# Patient Record
Sex: FEMALE | Race: AMERICAN INDIAN OR ALASKA NATIVE
[De-identification: names, ages, dates, MRNs, and addresses within clinical notes are randomized per-mention and may not be internally consistent; named-entity substitution may affect disease eponyms.]

---

## 2019-03-10 ENCOUNTER — HOSPITAL ENCOUNTER (OUTPATIENT)
Dept: HOSPITAL 5 - TRG | Age: 19
Setting detail: OBSERVATION
LOS: 1 days | Discharge: HOME | End: 2019-03-11
Attending: OBSTETRICS & GYNECOLOGY | Admitting: OBSTETRICS & GYNECOLOGY
Payer: MEDICAID

## 2019-03-10 DIAGNOSIS — Z3A.36: ICD-10-CM

## 2019-03-10 DIAGNOSIS — O60.03: Primary | ICD-10-CM

## 2019-03-10 LAB
BILIRUB UR QL STRIP: (no result)
BLOOD UR QL VISUAL: (no result)
HCT VFR BLD CALC: 33.6 % (ref 30.3–42.9)
HGB BLD-MCNC: 11.1 GM/DL (ref 10.1–14.3)
MCHC RBC AUTO-ENTMCNC: 33 % (ref 30–34)
MCV RBC AUTO: 85 FL (ref 79–97)
MUCOUS THREADS #/AREA URNS HPF: (no result) /HPF
PH UR STRIP: 7 [PH] (ref 5–7)
PLATELET # BLD: 253 K/MM3 (ref 140–440)
RBC # BLD AUTO: 3.96 M/MM3 (ref 3.65–5.03)
RBC #/AREA URNS HPF: 2 /HPF (ref 0–6)
UROBILINOGEN UR-MCNC: 2 MG/DL (ref ?–2)
WBC #/AREA URNS HPF: < 1 /HPF (ref 0–6)

## 2019-03-10 PROCEDURE — 96366 THER/PROPH/DIAG IV INF ADDON: CPT

## 2019-03-10 PROCEDURE — 36415 COLL VENOUS BLD VENIPUNCTURE: CPT

## 2019-03-10 PROCEDURE — 59025 FETAL NON-STRESS TEST: CPT

## 2019-03-10 PROCEDURE — G0378 HOSPITAL OBSERVATION PER HR: HCPCS

## 2019-03-10 PROCEDURE — 85027 COMPLETE CBC AUTOMATED: CPT

## 2019-03-10 PROCEDURE — 81001 URINALYSIS AUTO W/SCOPE: CPT

## 2019-03-10 PROCEDURE — 86592 SYPHILIS TEST NON-TREP QUAL: CPT

## 2019-03-10 PROCEDURE — 86901 BLOOD TYPING SEROLOGIC RH(D): CPT

## 2019-03-10 PROCEDURE — 96365 THER/PROPH/DIAG IV INF INIT: CPT

## 2019-03-10 PROCEDURE — 86850 RBC ANTIBODY SCREEN: CPT

## 2019-03-10 PROCEDURE — 86900 BLOOD TYPING SEROLOGIC ABO: CPT

## 2019-03-10 RX ADMIN — AMPICILLIN SODIUM SCH MLS/HR: 1 INJECTION, POWDER, FOR SOLUTION INTRAMUSCULAR; INTRAVENOUS at 23:36

## 2019-03-11 VITALS — SYSTOLIC BLOOD PRESSURE: 121 MMHG | DIASTOLIC BLOOD PRESSURE: 74 MMHG

## 2019-03-11 RX ADMIN — AMPICILLIN SODIUM SCH MLS/HR: 1 INJECTION, POWDER, FOR SOLUTION INTRAMUSCULAR; INTRAVENOUS at 03:38

## 2019-03-11 NOTE — SHORT STAY SUMMARY
Short Stay Documentation


Date of service: 19


Narrative H&P: 


19y/o  @ 36+4 week presents with irregular contractions and cervical 

dilation to 4cm. The patient denies leakage of fluid or vaginal bleeding. The 

patient was admitted for observation.








- History


Principal diagnosis:  labor


Past Medical History: No medical history


Past Surgical History: No surgical history


Social history: single





- Allergies and Medications


Current Medications: 


                                    Allergies





No Known Allergies Allergy (Verified 03/10/19 16:27)


   





                                Home Medications











 Medication  Instructions  Recorded  Confirmed  Last Taken  Type


 


Prenatal Vit,Guru 74/Iron/Folic 1 each PO DAILY 03/10/19 03/10/19 Unknown History





[Prenatal Low Iron Tablet]     








Active Medications





Butorphanol Tartrate (Stadol)  2 mg IV Q2H PRN


   PRN Reason: Labor Pain


Lactated Ringer's (Lactated Ringers)  1,000 mls @ 125 mls/hr IV AS DIRECT LUCA


   Last Admin: 03/10/19 18:19 Dose:  125 mls/hr


   Documented by: 


Ampicillin Sodium (Ampicillin/Ns 1 Gm/50 Ml)  1 gm in 50 mls @ 100 mls/hr IV 

Q4HR LUCA; Protocol


   Last Admin: 19 03:38 Dose:  100 mls/hr


   Documented by: 











- Physical exam


General appearance: no acute distress


Integumentary: no rash


HEENT: Atraumatic


Lungs: Clear to auscultation


Breasts: deferred





- Hospital course


Hospital course: 


Patient admitted @ 36+3 weeks for  labor. The patient was observed 

without further cervical change. She remained stable at 4cm. She denies leakage 

of fluid.








- Disposition


Condition at discharge: Good


Disposition: DC-01 TO HOME OR SELFCARE





Short Stay Discharge Plan


Activity: other (pelvic rest)


Diet: regular


Additional Instructions: 


schedule OB visit in one week

## 2019-03-12 ENCOUNTER — HOSPITAL ENCOUNTER (OUTPATIENT)
Dept: HOSPITAL 5 - TRG | Age: 19
Discharge: HOME | End: 2019-03-12
Attending: OBSTETRICS & GYNECOLOGY
Payer: MEDICAID

## 2019-03-12 VITALS — DIASTOLIC BLOOD PRESSURE: 58 MMHG | SYSTOLIC BLOOD PRESSURE: 123 MMHG

## 2019-03-12 DIAGNOSIS — O47.03: ICD-10-CM

## 2019-03-12 DIAGNOSIS — Z3A.37: Primary | ICD-10-CM

## 2019-03-12 PROCEDURE — 59025 FETAL NON-STRESS TEST: CPT

## 2019-03-13 ENCOUNTER — HOSPITAL ENCOUNTER (INPATIENT)
Dept: HOSPITAL 5 - TRG | Age: 19
LOS: 3 days | Discharge: HOME | End: 2019-03-16
Attending: OBSTETRICS & GYNECOLOGY | Admitting: OBSTETRICS & GYNECOLOGY
Payer: MEDICAID

## 2019-03-13 DIAGNOSIS — Z3A.37: ICD-10-CM

## 2019-03-13 DIAGNOSIS — J45.909: ICD-10-CM

## 2019-03-13 PROCEDURE — 86901 BLOOD TYPING SEROLOGIC RH(D): CPT

## 2019-03-13 PROCEDURE — 85027 COMPLETE CBC AUTOMATED: CPT

## 2019-03-13 PROCEDURE — 76819 FETAL BIOPHYS PROFIL W/O NST: CPT

## 2019-03-13 PROCEDURE — 86850 RBC ANTIBODY SCREEN: CPT

## 2019-03-13 PROCEDURE — 59025 FETAL NON-STRESS TEST: CPT

## 2019-03-13 PROCEDURE — 96365 THER/PROPH/DIAG IV INF INIT: CPT

## 2019-03-13 PROCEDURE — 86592 SYPHILIS TEST NON-TREP QUAL: CPT

## 2019-03-13 PROCEDURE — 88307 TISSUE EXAM BY PATHOLOGIST: CPT

## 2019-03-13 PROCEDURE — 86900 BLOOD TYPING SEROLOGIC ABO: CPT

## 2019-03-13 PROCEDURE — 76815 OB US LIMITED FETUS(S): CPT

## 2019-03-13 PROCEDURE — 96366 THER/PROPH/DIAG IV INF ADDON: CPT

## 2019-03-13 PROCEDURE — G0378 HOSPITAL OBSERVATION PER HR: HCPCS

## 2019-03-13 PROCEDURE — 36415 COLL VENOUS BLD VENIPUNCTURE: CPT

## 2019-03-13 PROCEDURE — 85014 HEMATOCRIT: CPT

## 2019-03-13 PROCEDURE — 81001 URINALYSIS AUTO W/SCOPE: CPT

## 2019-03-13 PROCEDURE — 85018 HEMOGLOBIN: CPT

## 2019-03-14 LAB
HCT VFR BLD CALC: 28.9 % (ref 30.3–42.9)
HCT VFR BLD CALC: 33.4 % (ref 30.3–42.9)
HGB BLD-MCNC: 10.9 GM/DL (ref 10.1–14.3)
HGB BLD-MCNC: 9.5 GM/DL (ref 10.1–14.3)
MCHC RBC AUTO-ENTMCNC: 33 % (ref 30–34)
MCV RBC AUTO: 86 FL (ref 79–97)
PLATELET # BLD: 261 K/MM3 (ref 140–440)
RBC # BLD AUTO: 3.9 M/MM3 (ref 3.65–5.03)

## 2019-03-14 PROCEDURE — 0KQM0ZZ REPAIR PERINEUM MUSCLE, OPEN APPROACH: ICD-10-PCS | Performed by: OBSTETRICS & GYNECOLOGY

## 2019-03-14 PROCEDURE — 00HU33Z INSERTION OF INFUSION DEVICE INTO SPINAL CANAL, PERCUTANEOUS APPROACH: ICD-10-PCS | Performed by: OBSTETRICS & GYNECOLOGY

## 2019-03-14 PROCEDURE — 3E0R3BZ INTRODUCTION OF ANESTHETIC AGENT INTO SPINAL CANAL, PERCUTANEOUS APPROACH: ICD-10-PCS | Performed by: OBSTETRICS & GYNECOLOGY

## 2019-03-14 RX ADMIN — IBUPROFEN SCH MG: 600 TABLET, FILM COATED ORAL at 13:46

## 2019-03-14 RX ADMIN — DOCUSATE SODIUM SCH MG: 100 CAPSULE, LIQUID FILLED ORAL at 22:28

## 2019-03-14 RX ADMIN — SENNOSIDES AND DOCUSATE SODIUM SCH TAB: 50; 8.6 TABLET ORAL at 22:29

## 2019-03-14 NOTE — ULTRASOUND REPORT
PROCEDURE: US OB LIMITED 

 

TECHNIQUE:  A limited OB sonogram was obtained for evaluation of the LO. 

 

HISTORY: LO 

 

COMPARISONS: None  

 

FINDINGS: 

 

The LO is 17.9 cm which is normal. The fetus is in cephalic presentation. The fetal heart rate is 14
8 BPM. 

 

IMPRESSION:  

 

Normal LO is 17.9 cm. 

Cephalic presentation. 

Fetal heart rate is 148 BPM.. 

 

This document is electronically signed by Bhupinder Beck MD., March 14 2019 04:32:09 AM ET

## 2019-03-14 NOTE — ANESTHESIA CONSULTATION
Anesthesia Consult and Med Hx


Date of service: 03/14/19





- Airway


Anesthetic Teeth Evaluation: Good


ROM Head & Neck: Adequate


Mental/Hyoid Distance: Adequate


Mallampati Class: Class III


Intubation Access Assessment: Probably Good





- Pulmonary Exam


CTA: Yes





- Cardiac Exam


Cardiac Exam: RRR





- Pre-Operative Health Status


ASA Pre-Surgery Classification: ASA2


Proposed Anesthetic Plan: Epidural





- Pulmonary


Hx Asthma: Yes


COPD: No


Hx Pneumonia: No





- Cardiovascular System


Hx Hypertension: No





- Central Nervous System


Hx Seizures: No


Hx Psychiatric Problems: No





- Endocrine


Hx Renal Disease: No


Hx End Stage Renal Disease: No


Hx Hypothyroidism: No


Hx Hyperthyroidism: No





- Hematic


Hx Anemia: No


Hx Sickle Cell Disease: No





- Other Systems


Hx Alcohol Use: No

## 2019-03-14 NOTE — HISTORY AND PHYSICAL REPORT
History of Present Illness


Date of examination: 19


Date of admission: 


19 02:50





Chief complaint: 





leaking water 


History of present illness: 





17 yo  at 37 weeks come into triage c/o leaking. She was noted to be srom 

and 3cm. [facility] She [facility] patient at 32 weeks. 

Unremarkable prenatal course 





Past History


Past Medical History: no pertinent history


Past Surgical History: no surgical history


Family/Genetic History: none


Social history: single.  denies: smoking, alcohol abuse, prescription drug abuse





- Obstetrical History


Expected Date of Delivery: 19


Actual Gestation: 37 Week(s) 0 Day(s) 


: 1


Para: 0


Hx # Term Pregnancies: 0


Number of  Pregnancies: 0


Spontaneous Abortions: 0


Induced : 0


Number of Living Children: 0





Medications and Allergies


                                    Allergies











Allergy/AdvReac Type Severity Reaction Status Date / Time


 


No Known Allergies Allergy   Verified 19 20:57











                                Home Medications











 Medication  Instructions  Recorded  Confirmed  Last Taken  Type


 


No Known Home Medications [No  19 Unknown History





Reported Home Medications]     











Active Meds: 


Active Medications





Acetaminophen (Tylenol)  650 mg PO Q4H PRN


   PRN Reason: Pain MILD(1-3)/Fever >100.5/HA


Acetaminophen/Hydrocodone Bitart (Norco 5/325)  2 each PO Q6H PRN


   PRN Reason: Pain, Moderate (4-6)


Bisacodyl (Dulcolax)  10 mg [place] BID PRN


   PRN Reason: Constipation


Butorphanol Tartrate (Stadol)  2 mg IV Q2H PRN


   PRN Reason: Labor Pain


Diphenhydramine HCl (Benadryl)  25 mg PO Q6H PRN


   PRN Reason: Itching


Diphtheria/Tetanus/Acell Pertussis (Boostrix)  0.5 ml IM .ONCE ONE


   Stop: 03/15/19 09:35


Docusate Sodium (Colace)  100 mg PO BID LUCA


Ephedrine Sulfate (Ephedrine Sulfate)  10 mg IV Q2M PRN


   PRN Reason: Hypotension


Lactated Ringer's (Lactated Ringers)  1,000 mls @ 999 mls/hr IV AS DIRECT LUCA


Oxytocin/Sodium Chloride (Pitocin/Ns 30 Unit/500ml)  30 units in 500 mls @ 1 

mls/hr IV TITR LUCA; Protocol


Ampicillin Sodium (Ampicillin/Ns 1 Gm/50 Ml)  1 gm in 50 mls @ 100 mls/hr IV 

Q4HR LUCA; Protocol


Fentanyl/Bupivacaine/Sodium Chlor (Fentanyl-Bupiv 2 Mcg/Ml-0.125%)  200 mcg in 

100 mls @ 12 mls/hr EPIDURAL TITR LUCA; Protocol


   Last Admin: 19 08:58 Dose:  12 mls/hr


   Documented by: 


Oxytocin/Sodium Chloride (Pitocin/Ns 20 Unit/1000ml Drip)  20 units in 1,000 mls

@ 250 mls/hr IV AS DIRECT LUCA


Ibuprofen (Motrin)  600 mg PO Q6H LUCA


Magnesium Hydroxide (Milk Of Magnesia)  30 ml PO HS PRN


   PRN Reason: Constipation


Measles/Mumps/Rubella Vaccine Live (M-M-R Ii Vaccine)  0.5 ml SUB-Q .ONCE ONE


   Stop: 03/15/19 09:35


Multi-Ingredient Ointment (Lansinoh)  1 applic TP PRN PRN


   PRN Reason: Sore Nipples


Multivitamins/Iron/Calcium (Prenatal Vitamin)  1 each PO QDAY [facility]


Naloxone HCl (Narcan 2 Mg/2 Ml)  0.2 mg IV Q5M PRN


   PRN Reason: Respiratory sedation


Ondansetron HCl (Zofran)  4 mg IV Q8H PRN


   PRN Reason: Nausea And Vomiting


Oxycodone/Acetaminophen (Percocet 5/325)  1 tab PO Q6H PRN


   PRN Reason: Pain, Moderate (4-6)


Promethazine HCl (Phenergan)  25 mg [place] Q6H PRN


   PRN Reason: Nausea And Vomiting


Promethazine HCl (Phenergan)  25 mg PO Q6H PRN


   PRN Reason: Nausea And Vomiting


Senna/Docusate Sodium (Senokot S)  2 tab PO Q12H [facility]


Sodium Chloride (Sodium Chloride Flush Syringe 10 Ml)  10 ml IV PRN NR


Witch Hazel/Glycerin (Tucks Pad)  1 each TP PRN PRN


   PRN Reason: Hemorrhoid/cleansing/soothing











Review of Systems


All systems: negative


Genitourinary: leakage of fluid





- Vital Signs


Vital signs: 


                                   Vital Signs











Pulse BP


 


 88   116/57 


 


 19 23:55  19 23:55








                                        











Temp Pulse Resp BP Pulse Ox


 


 99.0 F   85   20   116/53    


 


 03/14/19 00:43  19 09:39  19 00:43  19 09:39   














Results


Result Diagrams: 


                                 19 03:20





All other labs normal.








Assessment and Plan





A/P 


IUP 37 weeks, term pregnancy 


srom clear


augment  labor with pitocin


GBS unknown: amp initiated


offer epirdural 


expect vaginal delivery

## 2019-03-14 NOTE — PROCEDURE NOTE
OB Delivery Note





- Delivery


Date of Delivery: 19


Surgeon: BUNNY TRINH


Estimated blood loss: 300cc





- Vaginal


Delivery presentation: vertex


Delivery position: OA


Delivery induction: none


Delivery augmentation: pitocin


Delivery monitor: external FHT, external uterine


Route of delivery: 


Delivery placenta: spontaneous


Delivery cord: 3 umbilical vessels


Delivery laceration: 2nd degree


Delivery repair: vicryl


Anesthesia: epidural


Delivery comments: 





Patient was noted to be c/c /+1 and commenced to pushing a viable female In AO 

presentation at 0915  The shoulders delivered easily/ Viable female with apgars 

of 8 and 9. Suction of nasopharynx and oropharynx while baby laying on moms 

chest after delivery. Baby noted to be 5 pounds 10.5 oz. A 2nd degree laceration

noted with repair of 2-0 vicryl. excellent homstasis with 300 EBL. Patient 

tolerated procedure well. Mom and baby bonding. 





- Infant


  ** A


Apgar at 1 minute: 8


Apgar at 5 minutes: 9


Infant Gender: Female

## 2019-03-14 NOTE — ULTRASOUND REPORT
PROCEDURE: US OB FETAL BPP WO NON-STRESS 

 

TECHNIQUE:  A limited OB sonogram was performed for evaluation of the biophysical profile. 

 

HISTORY: fetal well being 

 

COMPARISONS: None  

 

FINDINGS: 

 

For fetal breathing movements, a score of 2 out of 2 was obtained. 

For fetal movements, a score of 2 out of 2 was obtained. 

For fetal posture intoeing, a score of 2 out of 2 was obtained.  

Qualitative amniotic fluid volume, a score of 2 out of 2 was obtained. 

The total biophysical profile score is 8 out of 8. The fetal heart rate is 148 BPM. 

 

IMPRESSION:  

 

Biophysical profile score is 8 out of 8.. 

 

This document is electronically signed by Bhupinder Beck MD., March 14 2019 04:26:26 AM ET

## 2019-03-15 RX ADMIN — DOCUSATE SODIUM SCH MG: 100 CAPSULE, LIQUID FILLED ORAL at 22:09

## 2019-03-15 RX ADMIN — IBUPROFEN SCH: 600 TABLET, FILM COATED ORAL at 16:45

## 2019-03-15 RX ADMIN — IBUPROFEN SCH MG: 600 TABLET, FILM COATED ORAL at 05:38

## 2019-03-15 RX ADMIN — IBUPROFEN SCH MG: 600 TABLET, FILM COATED ORAL at 14:20

## 2019-03-15 RX ADMIN — SENNOSIDES AND DOCUSATE SODIUM SCH: 50; 8.6 TABLET ORAL at 11:00

## 2019-03-15 RX ADMIN — IBUPROFEN SCH MG: 600 TABLET, FILM COATED ORAL at 00:10

## 2019-03-15 RX ADMIN — SENNOSIDES AND DOCUSATE SODIUM SCH TAB: 50; 8.6 TABLET ORAL at 22:09

## 2019-03-15 RX ADMIN — DOCUSATE SODIUM SCH MG: 100 CAPSULE, LIQUID FILLED ORAL at 09:52

## 2019-03-15 RX ADMIN — Medication SCH EACH: at 09:53

## 2019-03-15 NOTE — PROGRESS NOTE
Assessment and Plan





- Patient Problems


(1) Vaginal delivery


Current Visit: Yes   Status: Acute   


Plan to address problem: 


routine postpartum care


discharge home tomorrow








Subjective





- Subjective


Date of service: 03/15/19


Interval history: 


Patient visiting infant in NICU. Family states the patient has been doing well. 





[place]: in NICU





Objective





- Vital Signs


Latest vital signs: 


                                   Vital Signs











  Temp Pulse Resp BP BP Pulse Ox


 


 03/15/19 09:24  98.1 F  80  20  115/71   98


 


 03/15/19 05:38    17   


 


 03/15/19 00:00  98.4 F  73  18   110/66 


 


 19 19:48    18   








                                Intake and Output











 03/14/19 03/15/19 03/15/19





 22:59 06:59 14:59


 


Other:   


 


  # Voids   


 


    Void  4 














- Labs


Labs: 


                              Abnormal lab results











  19 Range/Units





  22:37 


 


Hgb  9.5 L  (10.1-14.3)  gm/dl


 


Hct  28.9 L  (30.3-42.9)  %

## 2019-03-15 NOTE — DISCHARGE SUMMARY
Providers





- Providers


Date of Admission: 


19 02:50





Date of discharge: 19


Attending physician: 


BUNNY TRINH MD





Primary care physician: 


BUNNY TRINH MD








Hospitalization


Reason for admission: active labor, rupture of membranes


Delivery: 


Discharge diagnosis: IUP at term delivered


[place] baby: female


Hospital course: 


Patient admitted with +SROM. Had a . Patient evaluated in NICU. Postpartum 

uncomplicated





Condition at discharge: Good


Disposition: DC-01 TO HOME OR SELFCARE





- Discharge Diagnoses


(1) Vaginal delivery


Status: Acute   





Plan





- Discharge Medications


Prescriptions: 


Ferrous Sulfate 325 mg PO BID #60 tablet.


Ibuprofen [Motrin] 600 mg PO Q8H PRN #30 tablet


 PRN Reason: Pain


oxyCODONE /ACETAMINOPHEN [Percocet 5/325] 1 tab PO Q6HR PRN #30 tablet


 PRN Reason: Pain





- Provider Discharge Summary


Activity: no sex for 6 weeks, no heavy lifting 4 weeks, no strenuous exercise


Diet: routine


Instructions: routine


Additional instructions: 


[]  Smoking cessation referral if applicable(refer to patient education folder 

for contact #)


[]  Refer to [facility] Booklet








Call your doctor immediately for:


* Fever > 100.5


* Heavy vaginal bleeding ( >1 pad per hour)


* Severe persistent headache


* Shortness of breath


* Reddened, hot, painful area to leg or breast


* schedule postpartum visit in 4 weeks





- Follow up plan

## 2019-03-16 VITALS — SYSTOLIC BLOOD PRESSURE: 105 MMHG | DIASTOLIC BLOOD PRESSURE: 58 MMHG

## 2019-03-16 RX ADMIN — SENNOSIDES AND DOCUSATE SODIUM SCH: 50; 8.6 TABLET ORAL at 10:22

## 2019-03-16 RX ADMIN — IBUPROFEN SCH MG: 600 TABLET, FILM COATED ORAL at 05:41

## 2019-03-16 RX ADMIN — IBUPROFEN SCH MG: 600 TABLET, FILM COATED ORAL at 10:20

## 2019-03-16 RX ADMIN — IBUPROFEN SCH MG: 600 TABLET, FILM COATED ORAL at 00:18

## 2019-03-16 RX ADMIN — DOCUSATE SODIUM SCH: 100 CAPSULE, LIQUID FILLED ORAL at 10:21

## 2019-03-16 RX ADMIN — IBUPROFEN SCH: 600 TABLET, FILM COATED ORAL at 17:48

## 2019-03-16 RX ADMIN — Medication SCH EACH: at 10:20

## 2019-08-04 ENCOUNTER — HOSPITAL ENCOUNTER (EMERGENCY)
Dept: HOSPITAL 5 - ED | Age: 19
LOS: 1 days | Discharge: HOME | End: 2019-08-05
Payer: SELF-PAY

## 2019-08-04 VITALS — SYSTOLIC BLOOD PRESSURE: 129 MMHG | DIASTOLIC BLOOD PRESSURE: 62 MMHG

## 2019-08-04 DIAGNOSIS — Z79.899: ICD-10-CM

## 2019-08-04 DIAGNOSIS — O20.0: Primary | ICD-10-CM

## 2019-08-04 DIAGNOSIS — Z3A.01: ICD-10-CM

## 2019-08-04 LAB
BASOPHILS # (AUTO): 0 K/MM3 (ref 0–0.1)
BASOPHILS NFR BLD AUTO: 0.6 % (ref 0–1.8)
BILIRUB UR QL STRIP: (no result)
BLOOD UR QL VISUAL: (no result)
EOSINOPHIL # BLD AUTO: 0 K/MM3 (ref 0–0.4)
EOSINOPHIL NFR BLD AUTO: 0.9 % (ref 0–4.3)
HCT VFR BLD CALC: 33.8 % (ref 30.3–42.9)
HGB BLD-MCNC: 11.6 GM/DL (ref 10.1–14.3)
LYMPHOCYTES # BLD AUTO: 1.3 K/MM3 (ref 1.2–5.4)
LYMPHOCYTES NFR BLD AUTO: 25.7 % (ref 13.4–35)
MCHC RBC AUTO-ENTMCNC: 34 % (ref 30–34)
MCV RBC AUTO: 84 FL (ref 79–97)
MONOCYTES # (AUTO): 0.5 K/MM3 (ref 0–0.8)
MONOCYTES % (AUTO): 10.2 % (ref 0–7.3)
PH UR STRIP: 7 [PH] (ref 5–7)
PLATELET # BLD: 408 K/MM3 (ref 140–440)
PROT UR STRIP-MCNC: (no result) MG/DL
RBC # BLD AUTO: 4.02 M/MM3 (ref 3.65–5.03)
RBC #/AREA URNS HPF: 2 /HPF (ref 0–6)
UROBILINOGEN UR-MCNC: < 2 MG/DL (ref ?–2)
WBC #/AREA URNS HPF: 1 /HPF (ref 0–6)

## 2019-08-04 PROCEDURE — 84703 CHORIONIC GONADOTROPIN ASSAY: CPT

## 2019-08-04 PROCEDURE — 81001 URINALYSIS AUTO W/SCOPE: CPT

## 2019-08-04 PROCEDURE — 76801 OB US < 14 WKS SINGLE FETUS: CPT

## 2019-08-04 PROCEDURE — 85025 COMPLETE CBC W/AUTO DIFF WBC: CPT

## 2019-08-04 PROCEDURE — 86900 BLOOD TYPING SEROLOGIC ABO: CPT

## 2019-08-04 PROCEDURE — 36415 COLL VENOUS BLD VENIPUNCTURE: CPT

## 2019-08-04 PROCEDURE — 76817 TRANSVAGINAL US OBSTETRIC: CPT

## 2019-08-04 PROCEDURE — 86901 BLOOD TYPING SEROLOGIC RH(D): CPT

## 2019-08-04 PROCEDURE — 84702 CHORIONIC GONADOTROPIN TEST: CPT

## 2019-08-04 NOTE — ULTRASOUND REPORT
By ultrasound



FINDINGS: Viable intrauterine pregnancy is seen with crown-rump length measurements corresponding to 
an MA of 8 weeks 0 days for an EDC of 3/15/2020. This correlates with the clinical dates. Fetal heart
 rate is 171 bpm. Left ovary is not seen. Right ovary contains a small 1.2 cm complex area. No free f
luid seen. No significant abnormality.



Signer Name: Matthew Park MD 

Signed: 8/4/2019 11:11 PM

 Workstation Name: [id number]

## 2019-08-04 NOTE — ULTRASOUND REPORT
See previous report



Signer Name: Matthew Park MD 

Signed: 8/4/2019 11:17 PM

 Workstation Name: [id number]

## 2019-08-04 NOTE — EVENT NOTE
ED Screening Note


Date of service: 19


Time: 21:28


ED Screening Note: 





This is a 19 y.o. F. that presents to the ER with vaginal bleeding and cramping 

that started today.





Positive pregnancy test yesterday ago.





LMP 06/15/2019,  A0





This initial assessment/diagnostic orders/clinical plan/treatment(s) is/are 

subject to change based on patients health status, clinical progression and re-

assessment by fellow clinical providers in the ED. Further treatment and workup 

at subsequent clinical providers discretion. Patient/guardian urged not to elope

from the ED as their condition may be serious if not clinically assessed and 

managed. 





Initial orders include: 





Labs and OB US

## 2019-08-04 NOTE — EMERGENCY DEPARTMENT REPORT
ED Female  HPI





- General


Chief complaint: Abdominal Pain


Stated complaint: BAD CRAMPS LOTS OF BLOOD


Time Seen by Provider: 19 21:28


Source: patient


Mode of arrival: Ambulatory


Limitations: No Limitations





- History of Present Illness


Initial comments: 





Patient is a 19-year-old female who presents the emergency room with complaints 

of suprapubic abdominal cramping that began today.  States she had one episode 

of vaginal bleeding.  Denies any vaginal bleeding currently.  She denies any 

dysuria, nausea, vomiting, fever.  The patient states she believes she is 

currently pregnant.  States her LNMP was Aliyah 15.  She states that she went to 

[facility] during her last pregnancy.  She denies any past 

medical history allergies to medications. /P:1/A:0





- Related Data


                                  Previous Rx's











 Medication  Instructions  Recorded  Last Taken  Type


 


Ferrous Sulfate 325 mg PO BID #60 tablet. 19 Unknown Rx


 


Ibuprofen [Motrin] 600 mg PO Q8H PRN #30 tablet 19 Unknown Rx


 


oxyCODONE /ACETAMINOPHEN [Percocet 1 tab PO Q6HR PRN #30 tablet 19 Unknown

 Rx





5/325]    











                                    Allergies











Allergy/AdvReac Type Severity Reaction Status Date / Time


 


No Known Allergies Allergy   Verified 19 20:57














ED Review of Systems


ROS: 


Stated complaint: BAD CRAMPS LOTS OF BLOOD


Other details as noted in HPI





Comment: All other systems reviewed and negative





ED Past Medical Hx





- Past Medical History


Previous Medical History?: No


Hx Hypertension: No


Hx Congestive Heart Failure: No


Hx Diabetes: No


Hx Deep Vein Thrombosis: No


Hx Renal Disease: No


Hx Sickle Cell Disease: No


Hx Seizures: No


Hx Asthma: No


Hx COPD: No


Hx HIV: No





- Surgical History


Past Surgical History?: No





- Social History


Smoking Status: Never Smoker


Substance Use Type: None





- Medications


Home Medications: 


                                Home Medications











 Medication  Instructions  Recorded  Confirmed  Last Taken  Type


 


Ferrous Sulfate 325 mg PO BID #60 tablet. 19  Unknown Rx


 


Ibuprofen [Motrin] 600 mg PO Q8H PRN #30 tablet 19  Unknown Rx


 


oxyCODONE /ACETAMINOPHEN [Percocet 1 tab PO Q6HR PRN #30 tablet 19  

Unknown Rx





5/325]     














ED Physical Exam





- General


Limitations: No Limitations


General appearance: alert, in no apparent distress





- Head


Head exam: Present: atraumatic, normocephalic





- Eye


Eye exam: Present: normal appearance





- ENT


ENT exam: Present: mucous membranes moist





- Respiratory


Respiratory exam: Present: normal lung sounds bilaterally.  Absent: respiratory 

distress, wheezes, rales, rhonchi, stridor, chest wall tenderness, accessory 

muscle use, decreased breath sounds, prolonged expiratory





- Cardiovascular


Cardiovascular Exam: Present: regular rate, normal rhythm, normal heart sounds. 

Absent: systolic murmur, diastolic murmur, rubs, gallop





- GI/Abdominal


GI/Abdominal exam: Present: soft, normal bowel sounds.  Absent: distended, 

tenderness, guarding, rebound, rigid





- Back Exam


Back exam: Absent: CVA tenderness (R), CVA tenderness (L)





- Neurological Exam


Neurological exam: Present: alert, oriented X3





- Psychiatric


Psychiatric exam: Present: normal affect, normal mood





- Skin


Skin exam: Present: warm, dry, intact





ED Course


                                   Vital Signs











  19





  21:27


 


Temperature 98.1 F


 


Pulse Rate 84


 


Respiratory 16





Rate 


 


Blood Pressure 129/62


 


O2 Sat by Pulse 99





Oximetry 














ED Medical Decision Making





- Lab Data


Result diagrams: 


                                 19 21:45











                                   Lab Results











  19 Range/Units





  21:31 21:44 21:45 


 


WBC    5.2  (4.5-11.0)  K/mm3


 


RBC    4.02  (3.65-5.03)  M/mm3


 


Hgb    11.6  (10.1-14.3)  gm/dl


 


Hct    33.8  (30.3-42.9)  %


 


MCV    84  (79-97)  fl


 


MCH    29  (28-32)  pg


 


MCHC    34  (30-34)  %


 


RDW    16.6 H  (13.2-15.2)  %


 


Plt Count    408  (140-440)  K/mm3


 


Lymph % (Auto)    25.7  (13.4-35.0)  %


 


Mono % (Auto)    10.2 H  (0.0-7.3)  %


 


Eos % (Auto)    0.9  (0.0-4.3)  %


 


Baso % (Auto)    0.6  (0.0-1.8)  %


 


Lymph #    1.3  (1.2-5.4)  K/mm3


 


Mono #    0.5  (0.0-0.8)  K/mm3


 


Eos #    0.0  (0.0-0.4)  K/mm3


 


Baso #    0.0  (0.0-0.1)  K/mm3


 


Seg Neutrophils %    62.6  (40.0-70.0)  %


 


Seg Neutrophils #    3.3  (1.8-7.7)  K/mm3


 


HCG, Qual     (Negative)  


 


HCG, Quant     (0-4)  mIU/mL


 


Urine Color  Straw    (Yellow)  


 


Urine Turbidity  Clear    (Clear)  


 


Urine pH  7.0    (5.0-7.0)  


 


Ur Specific Gravity  1.006    (1.003-1.030)  


 


Urine Protein  <15 mg/dl    (Negative)  mg/dL


 


Urine Glucose (UA)  Neg    (Negative)  mg/dL


 


Urine Ketones  Neg    (Negative)  mg/dL


 


Urine Blood  Sm    (Negative)  


 


Urine Nitrite  Neg    (Negative)  


 


Urine Bilirubin  Neg    (Negative)  


 


Urine Urobilinogen  < 2.0    (<2.0)  mg/dL


 


Ur Leukocyte Esterase  Neg    (Negative)  


 


Urine WBC (Auto)  1.0    (0.0-6.0)  /HPF


 


Urine RBC (Auto)  2.0    (0.0-6.0)  /HPF


 


U Epithel Cells (Auto)  2.0    (0-13.0)  /HPF


 


Blood Type   A POSITIVE   














  19 Range/Units





  21:45 21:45 


 


WBC    (4.5-11.0)  K/mm3


 


RBC    (3.65-5.03)  M/mm3


 


Hgb    (10.1-14.3)  gm/dl


 


Hct    (30.3-42.9)  %


 


MCV    (79-97)  fl


 


MCH    (28-32)  pg


 


MCHC    (30-34)  %


 


RDW    (13.2-15.2)  %


 


Plt Count    (140-440)  K/mm3


 


Lymph % (Auto)    (13.4-35.0)  %


 


Mono % (Auto)    (0.0-7.3)  %


 


Eos % (Auto)    (0.0-4.3)  %


 


Baso % (Auto)    (0.0-1.8)  %


 


Lymph #    (1.2-5.4)  K/mm3


 


Mono #    (0.0-0.8)  K/mm3


 


Eos #    (0.0-0.4)  K/mm3


 


Baso #    (0.0-0.1)  K/mm3


 


Seg Neutrophils %    (40.0-70.0)  %


 


Seg Neutrophils #    (1.8-7.7)  K/mm3


 


HCG, Qual   Positive  (Negative)  


 


HCG, Quant  [id number] H   (0-4)  mIU/mL


 


Urine Color    (Yellow)  


 


Urine Turbidity    (Clear)  


 


Urine pH    (5.0-7.0)  


 


Ur Specific Gravity    (1.003-1.030)  


 


Urine Protein    (Negative)  mg/dL


 


Urine Glucose (UA)    (Negative)  mg/dL


 


Urine Ketones    (Negative)  mg/dL


 


Urine Blood    (Negative)  


 


Urine Nitrite    (Negative)  


 


Urine Bilirubin    (Negative)  


 


Urine Urobilinogen    (<2.0)  mg/dL


 


Ur Leukocyte Esterase    (Negative)  


 


Urine WBC (Auto)    (0.0-6.0)  /HPF


 


Urine RBC (Auto)    (0.0-6.0)  /HPF


 


U Epithel Cells (Auto)    (0-13.0)  /HPF


 


Blood Type    














- Radiology Data


Radiology results: report reviewed





By ultrasound 





FINDINGS: Viable intrauterine pregnancy is seen with crown-rump length 

measurements corresponding 


to an MA of 8 weeks 0 days for an EDC of 3/15/2020. This correlates with the 

clinical dates. Fetal 


heart rate is 171 bpm. Left ovary is not seen. Right ovary contains a small 1.2 

cm complex area. No 


free fluid seen. No significant abnormality. 





Signer Name: Matthew Park MD 


Signed: 2019 11:11 PM 


Workstation Name: [id number] 








Transcribed By:  


Dictated By: Matthew Park MD 


Electronically Authenticated By: Matthew Park MD 


Signed Date/Time: 19 2311 








- Medical Decision Making





Patient is a 19-year-old female who presents the emergency room with complaints 

of suprapubic abdominal cramping that began today.  States she had one episode 

of vaginal bleeding.  Denies any vaginal bleeding currently.  She denies any 

dysuria, nausea, vomiting, fever.  The patient states she believes she is 

currently pregnant.  States her LNMP was Aliyah 15.  She states that she went to 

[facility] during her last pregnancy.  She denies any past 

medical history allergies to medications. /P:1/A:0. vitals are normal. cbc 

WNL. pt is Rh positive. hcg quant is [id number]. UA without evidence of UTI. OB US 

shows  Viable intrauterine pregnancy is seen with crown-rump length measurements

corresponding to an MA of 8 weeks 0 days for an EDC of 3/15/2020. This 

correlates with the clinical dates. Fetal heart rate is 171 bpm. Left ovary is 

not seen. Right ovary contains a small 1.2 cm complex area. No free fluid seen. 

No significant abnormality. discussed with pt to please begin taking daily 

prenatal vitamin over-the-counter.  Drink plenty of water. Follow up with an 

OB/GYN in the next 2-3 days.  Will need to have a repeat beta hCG quant in the 

next 2 days.  your quant today was [id number]. You may have this done at your OB/GYN

or at the emergency department if you're unable to see  your OB/GYN.  Return to 

the emergency room for any new or worsening symptoms.





- Differential Diagnosis


IUP, threatened , sponanteous , hemorrhagic cyst, UTI


Critical care attestation.: 


If time is entered above; I have spent that time in minutes in the direct care 

of this critically ill patient, excluding procedure time.








ED Disposition


Clinical Impression: 


 Suprapubic cramping, Vaginal bleeding, Threatened miscarriage





Disposition: DC- TO HOME OR SELFCARE


Is pt being admited?: No


Does the pt Need Aspirin: No


Condition: Stable


Instructions:  Threatened Miscarriage (ED)


Additional Instructions: 


Please begin taking daily prenatal vitamin over-the-counter.  Drink plenty of 

water. Follow up with an OB/GYN in the next 2-3 days.  Will need to have a 

repeat beta hCG quant in the next 2 days.  your quant today was [id number]. You may 

have this done at your OB/GYN or at the emergency department if you're unable to

see  your OB/GYN.  Return to the emergency room for any new or worsening 

symptoms.


Referrals: 


[facility] [Provider Group] - 2-3 Days


Time of Disposition: 23:37


Print Language: ENGLISH